# Patient Record
Sex: FEMALE | Race: WHITE | NOT HISPANIC OR LATINO | Employment: FULL TIME | URBAN - NONMETROPOLITAN AREA
[De-identification: names, ages, dates, MRNs, and addresses within clinical notes are randomized per-mention and may not be internally consistent; named-entity substitution may affect disease eponyms.]

---

## 2022-08-09 ENCOUNTER — OFFICE VISIT (OUTPATIENT)
Dept: ORTHOPEDIC SURGERY | Facility: CLINIC | Age: 57
End: 2022-08-09

## 2022-08-09 VITALS
WEIGHT: 145 LBS | BODY MASS INDEX: 24.75 KG/M2 | DIASTOLIC BLOOD PRESSURE: 78 MMHG | SYSTOLIC BLOOD PRESSURE: 119 MMHG | HEART RATE: 77 BPM | OXYGEN SATURATION: 97 % | HEIGHT: 64 IN

## 2022-08-09 DIAGNOSIS — S46.211S BICEPS STRAIN, RIGHT, SEQUELA: ICD-10-CM

## 2022-08-09 DIAGNOSIS — M75.81 INFRASPINATUS TENDONITIS, RIGHT: ICD-10-CM

## 2022-08-09 DIAGNOSIS — M75.41 IMPINGEMENT SYNDROME OF RIGHT SHOULDER: ICD-10-CM

## 2022-08-09 DIAGNOSIS — M25.611 SHOULDER STIFFNESS, RIGHT: ICD-10-CM

## 2022-08-09 DIAGNOSIS — G25.89 SCAPULAR DYSKINESIS: ICD-10-CM

## 2022-08-09 DIAGNOSIS — M75.21 BICEPS TENDONITIS ON RIGHT: Primary | ICD-10-CM

## 2022-08-09 PROCEDURE — 99204 OFFICE O/P NEW MOD 45 MIN: CPT | Performed by: FAMILY MEDICINE

## 2022-08-09 RX ORDER — MELOXICAM 7.5 MG/1
7.5 TABLET ORAL DAILY
Qty: 30 TABLET | Refills: 1 | Status: SHIPPED | OUTPATIENT
Start: 2022-08-09 | End: 2022-09-15 | Stop reason: SDUPTHER

## 2022-08-09 NOTE — PROGRESS NOTES
"New Patient Visit      Patient: Denae Dunham  YOB: 1965  Date of Encounter: 08/09/2022  PCP: Ralph Rey APRN  Referring Provider: No ref. provider found     Subjective   Denae Dunham is a 57 y.o. female who presents to the office today for evaluation of Pain, Numbness, and Initial Evaluation of the Right Upper Arm (Tingling in fingers)      Chief Complaint   Patient presents with   • Right Upper Arm - Pain, Numbness, Initial Evaluation     Tingling in fingers       HPI   Patient states she has been experiencing right arm pain since 05/2022. She explains the pain radiates down to her elbow. She describes the pain as aching \" toothache \". She has not worked since the end of 06/2022 due to the pain. She explains the pain is exacerbated when she lifts her arm up at the shoulder. She denies pain in her elbow or shoulder. She has tried Aleve and ibuprofen with no relief. She has also tried ice with no relief. She has also tried Biofreeze with no relief. She has not participated in physical therapy. She was seen by her PCP and was advised to take Aleve and ibuprofen. She had an x-ray performed 2 weeks ago. She denies any neck pain or radiating pain. She denies any numbness or weakness in her hand.      There is no problem list on file for this patient.      History reviewed. No pertinent past medical history.    Past Surgical History:   Procedure Laterality Date   • FOOT SURGERY     • TONSILECTOMY, ADENOIDECTOMY, BILATERAL MYRINGOTOMY AND TUBES     • TUBAL ABDOMINAL LIGATION         Family History   Problem Relation Age of Onset   • Cancer Mother    • Cancer Father    • Cancer Maternal Grandmother    • Cancer Paternal Grandmother        Social History     Socioeconomic History   • Marital status:    Tobacco Use   • Smoking status: Never Smoker   • Smokeless tobacco: Never Used   Vaping Use   • Vaping Use: Never used   Substance and Sexual Activity   • Alcohol use: Never       Current " "Outpatient Medications   Medication Sig Dispense Refill   • meloxicam (MOBIC) 7.5 MG tablet Take 1 tablet by mouth Daily. 30 tablet 1     No current facility-administered medications for this visit.       No Known Allergies    Review of Systems   Constitutional: Positive for activity change. Negative for fever.   Respiratory: Negative for shortness of breath and wheezing.    Cardiovascular: Negative for chest pain.   Musculoskeletal: Positive for arthralgias and myalgias.        Right arm pain.   Skin: Negative for color change and wound.   Neurological: Negative for weakness and numbness.       Visit Vitals  /78   Pulse 77   Ht 162.6 cm (64\")   Wt 65.8 kg (145 lb)   SpO2 97%   BMI 24.89 kg/m²     57 y.o.female  Physical Exam  Vitals and nursing note reviewed.   Constitutional:       General: She is not in acute distress.     Appearance: Normal appearance.   Pulmonary:      Effort: Pulmonary effort is normal. No respiratory distress.   Skin:     General: Skin is warm and dry.      Findings: No erythema.   Neurological:      General: No focal deficit present.      Mental Status: She is alert.      Sensory: No sensory deficit.      Motor: No weakness.       Right upper extremity exam:  Mild tenderness at the coracoid, not so much on the long head biceps tendon at the groove.  Not tender on the elbow joint anywhere.  Normal strength.  Pinch and  strength intact.  Intact radial pulses.  Intact deep tendon reflexes.  Positive Speed test.  Pain on resisted testing of biceps.  No pain on tricep.  Pain on resisted external rotation is also painful at the lower bicep.  Pain on shoulder range of motion, but all felt at the distal biceps.  Restricted rotation of the right shoulder.  Pain on impingement sign.  Biceps are symmetric with no Chuck sign.  No winging.    Radiology Results:    No radiology results for the last 30 days.    Assessment & Plan   Diagnoses and all orders for this visit:    1. Biceps tendonitis " on right (Primary)  -     meloxicam (MOBIC) 7.5 MG tablet; Take 1 tablet by mouth Daily.  Dispense: 30 tablet; Refill: 1  -     Ambulatory Referral to Physical Therapy Evaluate and treat    2. Biceps strain, right, sequela  -     meloxicam (MOBIC) 7.5 MG tablet; Take 1 tablet by mouth Daily.  Dispense: 30 tablet; Refill: 1  -     Ambulatory Referral to Physical Therapy Evaluate and treat    3. Shoulder stiffness, right  -     meloxicam (MOBIC) 7.5 MG tablet; Take 1 tablet by mouth Daily.  Dispense: 30 tablet; Refill: 1  -     Ambulatory Referral to Physical Therapy Evaluate and treat    4. Infraspinatus tendonitis, right  -     meloxicam (MOBIC) 7.5 MG tablet; Take 1 tablet by mouth Daily.  Dispense: 30 tablet; Refill: 1  -     Ambulatory Referral to Physical Therapy Evaluate and treat    5. Impingement syndrome of right shoulder  -     meloxicam (MOBIC) 7.5 MG tablet; Take 1 tablet by mouth Daily.  Dispense: 30 tablet; Refill: 1  -     Ambulatory Referral to Physical Therapy Evaluate and treat    6. Scapular dyskinesis  -     meloxicam (MOBIC) 7.5 MG tablet; Take 1 tablet by mouth Daily.  Dispense: 30 tablet; Refill: 1  -     Ambulatory Referral to Physical Therapy Evaluate and treat         MEDS ORDERED DURING VISIT:  New Medications Ordered This Visit   Medications   • meloxicam (MOBIC) 7.5 MG tablet     Sig: Take 1 tablet by mouth Daily.     Dispense:  30 tablet     Refill:  1     MEDICATION ISSUES:  Discussed medication options and treatment plan of prescribed medication as well as the risks, benefits, and side effects including potential falls, possible impaired driving and metabolic adversities among others. Patient is agreeable to call the office with any worsening of symptoms or onset of side effects. Patient is agreeable to call 911 or go to the nearest ER should he/she begin having SI/HI.     Discussion:    Patient large pain is largely related to biceps tendinitis exacerbated by dyskinetic scapular  motion. Also having some stiffness in the shoulder, impingement and pain of the infraspinatus. Patient is referred to physical therapy which she wants to schedule herself. I have started her on Mobic daily and given her home exercise handouts to start on now. Would consider further intervention including imaging of the shoulder, injections, advanced imaging, muscle relaxers if not improving. Follow up in 3-4 weeks for reevaluation after getting at least 2 weeks of physical therapy.             This document has been electronically signed by Declan Warren DO   August 9, 2022 11:27 EDT    Part of this note may be an electronic transcription/translation of spoken language to printed text using the Dragon Dictation System.    Transcribed from ambient dictation for Declan Warren DO by DEIRDRE JORDAN.  08/09/22   11:53 EDT    Patient verbalized consent to the visit recording.  I have personally performed the services described in this document as transcribed by the above individual, and it is both accurate and complete.  Declan Warren DO  8/17/2022  10:26 EDT

## 2022-09-15 ENCOUNTER — OFFICE VISIT (OUTPATIENT)
Dept: ORTHOPEDIC SURGERY | Facility: CLINIC | Age: 57
End: 2022-09-15

## 2022-09-15 VITALS
HEIGHT: 64 IN | OXYGEN SATURATION: 98 % | SYSTOLIC BLOOD PRESSURE: 133 MMHG | WEIGHT: 145 LBS | HEART RATE: 70 BPM | DIASTOLIC BLOOD PRESSURE: 86 MMHG | BODY MASS INDEX: 24.75 KG/M2

## 2022-09-15 DIAGNOSIS — M75.81 INFRASPINATUS TENDONITIS, RIGHT: ICD-10-CM

## 2022-09-15 DIAGNOSIS — M25.611 SHOULDER STIFFNESS, RIGHT: ICD-10-CM

## 2022-09-15 DIAGNOSIS — G25.89 SCAPULAR DYSKINESIS: ICD-10-CM

## 2022-09-15 DIAGNOSIS — M75.21 BICEPS TENDONITIS ON RIGHT: ICD-10-CM

## 2022-09-15 DIAGNOSIS — M75.41 IMPINGEMENT SYNDROME OF RIGHT SHOULDER: ICD-10-CM

## 2022-09-15 DIAGNOSIS — S46.211S BICEPS STRAIN, RIGHT, SEQUELA: ICD-10-CM

## 2022-09-15 PROCEDURE — 99214 OFFICE O/P EST MOD 30 MIN: CPT | Performed by: FAMILY MEDICINE

## 2022-09-15 RX ORDER — MELOXICAM 7.5 MG/1
7.5 TABLET ORAL DAILY
Qty: 30 TABLET | Refills: 2 | Status: SHIPPED | OUTPATIENT
Start: 2022-09-15

## 2022-09-15 NOTE — PROGRESS NOTES
New Patient Visit      Patient: Denae Dunham  YOB: 1965  Date of Encounter: 09/15/2022  PCP: Ralph Rey APRN  Referring Provider: No ref. provider found     Subjective   Denae Dunham is a 57 y.o. female who presents to the office today for evaluation of Follow-up and Pain of the Right Arm (Bicep pain)      Chief Complaint   Patient presents with   • Right Arm - Follow-up, Pain     Bicep pain       HPI     Patient continues to have right arm and shoulder pain. The pain is localized to the deltoid distribution in the right arm. The pain is worse at night. The pain does not radiate up to the neck. Patient did physical therapy for a couple of weeks and was able to do the exercises at home. Patient has been doing the exercises at home but not every day. Patient has been off work for the past 2 weeks because they will not allow her to go back without and go back with light duty. Patient has been taking Mobic with some relief. Patient feels 60 percent improved since starting the medication.    There is no problem list on file for this patient.      History reviewed. No pertinent past medical history.    Past Surgical History:   Procedure Laterality Date   • FOOT SURGERY     • TONSILECTOMY, ADENOIDECTOMY, BILATERAL MYRINGOTOMY AND TUBES     • TUBAL ABDOMINAL LIGATION         Family History   Problem Relation Age of Onset   • Cancer Mother    • Cancer Father    • Cancer Maternal Grandmother    • Cancer Paternal Grandmother        Social History     Socioeconomic History   • Marital status:    Tobacco Use   • Smoking status: Never Smoker   • Smokeless tobacco: Never Used   Vaping Use   • Vaping Use: Never used   Substance and Sexual Activity   • Alcohol use: Never       Current Outpatient Medications   Medication Sig Dispense Refill   • meloxicam (MOBIC) 7.5 MG tablet Take 1 tablet by mouth Daily. 30 tablet 2     No current facility-administered medications for this visit.       No Known  "Allergies    Review of Systems   Constitutional: Positive for activity change. Negative for fever.   Respiratory: Negative for shortness of breath and wheezing.    Cardiovascular: Negative for chest pain.   Musculoskeletal: Positive for arthralgias and myalgias.        Right arm pain.   Skin: Negative for color change and wound.   Neurological: Negative for weakness and numbness.       Visit Vitals  /86   Pulse 70   Ht 162.6 cm (64\")   Wt 65.8 kg (145 lb)   SpO2 98%   BMI 24.89 kg/m²     57 y.o.female  Physical Exam  Vitals and nursing note reviewed.   Constitutional:       General: She is not in acute distress.     Appearance: Normal appearance.   Pulmonary:      Effort: Pulmonary effort is normal. No respiratory distress.   Musculoskeletal:      Comments:   Right arm:  Tender on the biceps tendon.  Tender on the coracoid.  Full range of motion.  Pain at end range external rotation.  Pain on [place] test.  Pain without weakness on supraspinatus testing.  Pain on biceps testing without weakness.  Pain on Speed's test.  No pain on the rest of the rotator cuff or triceps.  Impingement signs cause pain in the biceps.  Mild dyskinetic scapular motion on the right.    Neck:  Nontender neck and paraspinals.  Patient does have some muscle spasm.  No pain on any movement of the neck or on Spurling's.   Skin:     General: Skin is warm and dry.      Findings: No erythema.   Neurological:      General: No focal deficit present.      Mental Status: She is alert.      Sensory: No sensory deficit.      Motor: No weakness.         Radiology Results:    No radiology results for the last 30 days.    Assessment & Plan   Diagnoses and all orders for this visit:    1. Biceps tendonitis on right  -     meloxicam (MOBIC) 7.5 MG tablet; Take 1 tablet by mouth Daily.  Dispense: 30 tablet; Refill: 2    2. Biceps strain, right, sequela  -     meloxicam (MOBIC) 7.5 MG tablet; Take 1 tablet by mouth Daily.  Dispense: 30 tablet; Refill: " 2    3. Shoulder stiffness, right  -     meloxicam (MOBIC) 7.5 MG tablet; Take 1 tablet by mouth Daily.  Dispense: 30 tablet; Refill: 2    4. Infraspinatus tendonitis, right  -     meloxicam (MOBIC) 7.5 MG tablet; Take 1 tablet by mouth Daily.  Dispense: 30 tablet; Refill: 2    5. Impingement syndrome of right shoulder  -     meloxicam (MOBIC) 7.5 MG tablet; Take 1 tablet by mouth Daily.  Dispense: 30 tablet; Refill: 2    6. Scapular dyskinesis  -     meloxicam (MOBIC) 7.5 MG tablet; Take 1 tablet by mouth Daily.  Dispense: 30 tablet; Refill: 2         MEDS ORDERED DURING VISIT:  New Medications Ordered This Visit   Medications   • meloxicam (MOBIC) 7.5 MG tablet     Sig: Take 1 tablet by mouth Daily.     Dispense:  30 tablet     Refill:  2     MEDICATION ISSUES:  Discussed medication options and treatment plan of prescribed medication as well as the risks, benefits, and side effects including potential falls, possible impaired driving and metabolic adversities among others. Patient is agreeable to call the office with any worsening of symptoms or onset of side effects. Patient is agreeable to call 911 or go to the nearest ER should he/she begin having SI/HI.     Discussion:  Patient was having notable improvement from physical therapy after 2 weeks but discontinued for some reason. She has been doing the exercises at home, but she is still off from work as they will not allow her to go back without and to go back with light duty. Patient continues to have apparent significant biceps tendinitis and biceps muscle pain which is bothering her at night as well, but also seems to be having some pain coming from the labrum and supraspinatus. Recommend patient go back to therapy three times a week while she is off from work and we will reevaluate again in 2 to 3 weeks. Would consider further intervention such as injections or advanced imaging if not improving. Refilled patient's Mobic and would consider increasing dose if  needed.               This document has been electronically signed by Declan Warren DO   September 15, 2022 09:39 EDT    Part of this note may be an electronic transcription/translation of spoken language to printed text using the Dragon Dictation System.    Transcribed from ambient dictation for Declan Warren DO by HERLINDA GENAO.  09/15/22   10:02 EDT    Patient verbalized consent to the visit recording.  I have personally performed the services described in this document as transcribed by the above individual, and it is both accurate and complete.

## 2022-09-19 ENCOUNTER — TELEPHONE (OUTPATIENT)
Dept: ORTHOPEDIC SURGERY | Facility: CLINIC | Age: 57
End: 2022-09-19

## 2022-09-19 DIAGNOSIS — S46.211S BICEPS STRAIN, RIGHT, SEQUELA: ICD-10-CM

## 2022-09-19 DIAGNOSIS — M75.81 INFRASPINATUS TENDONITIS, RIGHT: ICD-10-CM

## 2022-09-19 DIAGNOSIS — M75.41 IMPINGEMENT SYNDROME OF RIGHT SHOULDER: ICD-10-CM

## 2022-09-19 DIAGNOSIS — G25.89 SCAPULAR DYSKINESIS: ICD-10-CM

## 2022-09-19 DIAGNOSIS — M25.611 SHOULDER STIFFNESS, RIGHT: ICD-10-CM

## 2022-09-19 DIAGNOSIS — M75.21 BICEPS TENDONITIS ON RIGHT: Primary | ICD-10-CM

## 2022-09-19 NOTE — TELEPHONE ENCOUNTER
Caller: [facility]    Relationship: PHYSICAL THERAPY    Best call back number:[phone]    What specialty or service is being requested: PHYSICAL THERAPY    What is the provider, practice or medical service name: [facility]      Any additional details: PATIENT HAS APPT 9.20.22 AND CURRENT REFERRAL IS DATED 8.9.22  [facility] REQUESTING UPDATED REFERRAL IN TIME FOR TOMORROW'S APPT    UNABLE TO WARM TRANSFER

## 2022-09-19 NOTE — TELEPHONE ENCOUNTER
Hub staff attempted to follow warm transfer process and was unsuccessful     Caller: Denae Dunham    Relationship to patient: Self    Best call back number: [phone]    Patient is needing: PATIENT WANTED TO SPEAK WITH DR TELLEZ NURSE- WANTED TO KNOW IF SHE DECIDED TO DO THE INJECTION IF SHE WOULD STILL HAVE TO GO TO THERAPY    PLEASE CALL TO [facility] -TY

## 2022-09-21 ENCOUNTER — TELEPHONE (OUTPATIENT)
Dept: ORTHOPEDIC SURGERY | Facility: CLINIC | Age: 57
End: 2022-09-21

## 2022-09-21 NOTE — TELEPHONE ENCOUNTER
Called patient in response to her message about if she got the injection would she have to do therapy. I told her that Dr. Warren said that it would depend if the injection helped. She said she has 2 more visits with PT so will just finish up then come back in and if PT didn't help then do the injection.

## 2022-10-04 ENCOUNTER — HOSPITAL ENCOUNTER (OUTPATIENT)
Dept: GENERAL RADIOLOGY | Facility: HOSPITAL | Age: 57
Discharge: HOME | End: 2022-10-04
Admitting: FAMILY MEDICINE

## 2022-10-04 ENCOUNTER — OFFICE VISIT (OUTPATIENT)
Dept: ORTHOPEDIC SURGERY | Facility: CLINIC | Age: 57
End: 2022-10-04

## 2022-10-04 VITALS
HEIGHT: 64 IN | OXYGEN SATURATION: 99 % | DIASTOLIC BLOOD PRESSURE: 90 MMHG | HEART RATE: 72 BPM | SYSTOLIC BLOOD PRESSURE: 139 MMHG | WEIGHT: 145 LBS | BODY MASS INDEX: 24.75 KG/M2

## 2022-10-04 DIAGNOSIS — M77.9 BONE SPUR: ICD-10-CM

## 2022-10-04 DIAGNOSIS — G25.89 SCAPULAR DYSKINESIS: ICD-10-CM

## 2022-10-04 DIAGNOSIS — M75.41 IMPINGEMENT SYNDROME OF RIGHT SHOULDER: ICD-10-CM

## 2022-10-04 DIAGNOSIS — M25.611 SHOULDER STIFFNESS, RIGHT: ICD-10-CM

## 2022-10-04 DIAGNOSIS — M75.81 INFRASPINATUS TENDONITIS, RIGHT: ICD-10-CM

## 2022-10-04 DIAGNOSIS — M79.621: ICD-10-CM

## 2022-10-04 DIAGNOSIS — S46.211S BICEPS STRAIN, RIGHT, SEQUELA: ICD-10-CM

## 2022-10-04 DIAGNOSIS — M75.21 BICEPS TENDONITIS ON RIGHT: Primary | ICD-10-CM

## 2022-10-04 PROCEDURE — 73030 X-RAY EXAM OF SHOULDER: CPT

## 2022-10-04 PROCEDURE — 99214 OFFICE O/P EST MOD 30 MIN: CPT | Performed by: FAMILY MEDICINE

## 2022-10-04 PROCEDURE — 73030 X-RAY EXAM OF SHOULDER: CPT | Performed by: RADIOLOGY

## 2022-10-04 NOTE — PROGRESS NOTES
"Follow Up Visit      Patient: Denae Dunham  YOB: 1965  Date of Encounter: 10/04/2022  PCP: Ralph Rey APRN  Referring Provider: No ref. provider found     Subjective   Denae Dunham is a 57 y.o. female who presents to the office today for evaluation of Pain and Follow-up of the Right Shoulder (In bicep)      Chief Complaint   Patient presents with   • Right Shoulder - Pain, Follow-up     In bicep       HPI      The patient is a 57-year-old female who presents to the office today for evaluation of right arm, bicep and shoulder pain.    The patient presents for follow up for right arm and bicep pain, and right shoulder pain.    The patient was started on meloxicam at the last visit and has been doing physical therapy. She had discontinued it at the last visit, but I recommended she start back up while she was off from work. She presents after doing 2 more weeks of therapy. The patient reports her shoulder is doing well during the day, however, at night, she is still experiencing pain in the right arm. She has been going to physical therapy for the past 2 weeks with no improvement. She has been taking the meloxicam 2 pills per day for the past 2 weeks with no improvement. She has tried sleeping in the sling with no improvement.    Patient had a breast biopsy in 10/2021.        There is no problem list on file for this patient.      History reviewed. No pertinent past medical history.    No Known Allergies    Review of Systems   Constitutional: Positive for activity change. Negative for fever.   Respiratory: Negative for shortness of breath and wheezing.    Cardiovascular: Negative for chest pain.   Musculoskeletal: Positive for arthralgias and myalgias.        Right arm pain.   Skin: Negative for color change and wound.   Neurological: Negative for weakness and numbness.       Visit Vitals  /90   Pulse 72   Ht 162.6 cm (64\")   Wt 65.8 kg (145 lb)   SpO2 99%   BMI 24.89 kg/m²     57 " y.o.female  Physical Exam  Vitals and nursing note reviewed.   Constitutional:       General: She is not in acute distress.     Appearance: Normal appearance.   Neck:      Comments: Non-tender cervical spine, nontender paraspinals.  Tender in the trapezius.  Mildly restricted neck range of motion.  Negative Spurling.    Pulmonary:      Effort: Pulmonary effort is normal. No respiratory distress.   Musculoskeletal:      Comments: Tender in the deltoid distribution on the right shoulder.  Tender over coracoid and biceps tendon.  Full range of motion.  Pain on end range of external rotation felt in the deltoid.  Very mild pain on [place] test.  Negative Neer.  Pain in the deltoid and biceps on Chandler test.  Pulling in the biceps.  Pain in the biceps on resisted testing without weakness.  Triceps is okay.  Pain in the biceps on infraspinatus testing.  No weakness.  Negative testing of subscapularis.  AC crossover and shear caused pain in the deltoid area and biceps area.  Pain in the distal biceps on Speed test.  Very mild soreness.  Dyskinetic right scapular motion, particularly on arm flexion and mild winging on wall push-up.   Skin:     General: Skin is warm and dry.      Findings: No erythema.   Neurological:      General: No focal deficit present.      Mental Status: She is alert.      Sensory: No sensory deficit.      Motor: No weakness.         Radiology Results:    XR Shoulder 2+ View Right    Result Date: 10/4/2022    No acute findings in the right shoulder.  This report was finalized on 10/4/2022 11:24 AM by Dr. Suhas Obrien MD.      X-ray right shoulder 10/4/2022 preliminary result no significant degenerative changes.  No acute fractures or bony abnormalities.  Small spur/enthesophyte at deltoid origin on superior acromion.  Patient is tender at this location.  Also enthesophyte/tendon calcification on humeral head at supraspinatus/infraspinatus insertion point.        Assessment & Plan   Diagnoses and all  orders for this visit:    1. Biceps tendonitis on right (Primary)  -     XR Shoulder 2+ View Right  -     MRI Shoulder Right Without Contrast; Future    2. Biceps strain, right, sequela  -     XR Shoulder 2+ View Right  -     MRI Shoulder Right Without Contrast; Future    3. Shoulder stiffness, right  -     XR Shoulder 2+ View Right  -     MRI Shoulder Right Without Contrast; Future    4. Infraspinatus tendonitis, right  -     XR Shoulder 2+ View Right  -     MRI Shoulder Right Without Contrast; Future    5. Impingement syndrome of right shoulder  -     XR Shoulder 2+ View Right  -     MRI Shoulder Right Without Contrast; Future    6. Scapular dyskinesis  -     XR Shoulder 2+ View Right  -     MRI Shoulder Right Without Contrast; Future    7. Pain in superior right upper extremity  -     XR Shoulder 2+ View Right  -     MRI Shoulder Right Without Contrast; Future         MEDS ORDERED DURING VISIT:  No orders of the defined types were placed in this encounter.    MEDICATION ISSUES:  Discussed medication options and treatment plan of prescribed medication as well as the risks, benefits, and side effects including potential falls, possible impaired driving and metabolic adversities among others. Patient is agreeable to call the office with any worsening of symptoms or onset of side effects. Patient is agreeable to call 911 or go to the nearest ER should he/she begin having SI/HI.     Discussion:  Bone spur on the acromion does explain the patient's deltoid pain, however it does not explain her ongoing biceps/labrum type symptoms.      The patient continues to have right upper arm and shoulder pain radiating down from the shoulder into the bicep, mainly the bicep but also sometimes the deltoid distribution. There does not seem to be a cervical component at this time or neurologic component but the patient is still getting significant pain at night when lying down. Range of motion is improved with PT but pain is not.  Still getting pain at end range of external rotation, pain on AC crossover and impingement signs and bicep testing and [place] testing. I am concerned for damage to the proximal bicep and the labrum in particular and so I am recommending an MRI to fully evaluate these areas which have not been improving with conservative management. We will follow up for MRI results.             This document has been electronically signed by Declan Warren DO   October 4, 2022 10:04 EDT    Dictated Utilizing Dragon Dictation: Part of this note may be an electronic transcription/translation of spoken language to printed text using the Dragon Dictation System.    .Transcribed from ambient dictation for Declan Warren DO by Jessenia Rey.  10/04/22   11:25 EDT    Patient verbalized consent to the visit recording.  I have personally performed the services described in this document as transcribed by the above individual, and it is both accurate and complete.   Transcribed from ambient dictation for Declan Warren DO by Silvia Serrato  10/04/22   11:25 EDT

## 2022-10-24 ENCOUNTER — HOSPITAL ENCOUNTER (OUTPATIENT)
Dept: MRI IMAGING | Facility: HOSPITAL | Age: 57
Discharge: HOME | End: 2022-10-24
Admitting: FAMILY MEDICINE

## 2022-10-24 DIAGNOSIS — M75.81 INFRASPINATUS TENDONITIS, RIGHT: ICD-10-CM

## 2022-10-24 DIAGNOSIS — M75.41 IMPINGEMENT SYNDROME OF RIGHT SHOULDER: ICD-10-CM

## 2022-10-24 DIAGNOSIS — M75.21 BICEPS TENDONITIS ON RIGHT: ICD-10-CM

## 2022-10-24 DIAGNOSIS — M79.621: ICD-10-CM

## 2022-10-24 DIAGNOSIS — G25.89 SCAPULAR DYSKINESIS: ICD-10-CM

## 2022-10-24 DIAGNOSIS — M25.611 SHOULDER STIFFNESS, RIGHT: ICD-10-CM

## 2022-10-24 DIAGNOSIS — S46.211S BICEPS STRAIN, RIGHT, SEQUELA: ICD-10-CM

## 2022-10-24 PROCEDURE — 73221 MRI JOINT UPR EXTREM W/O DYE: CPT | Performed by: RADIOLOGY

## 2022-10-24 PROCEDURE — 73221 MRI JOINT UPR EXTREM W/O DYE: CPT

## 2022-10-27 ENCOUNTER — OFFICE VISIT (OUTPATIENT)
Dept: ORTHOPEDIC SURGERY | Facility: CLINIC | Age: 57
End: 2022-10-27

## 2022-10-27 ENCOUNTER — APPOINTMENT (OUTPATIENT)
Dept: MRI IMAGING | Facility: HOSPITAL | Age: 57
End: 2022-10-27

## 2022-10-27 VITALS
DIASTOLIC BLOOD PRESSURE: 78 MMHG | SYSTOLIC BLOOD PRESSURE: 117 MMHG | OXYGEN SATURATION: 100 % | HEIGHT: 64 IN | HEART RATE: 78 BPM | WEIGHT: 145 LBS | BODY MASS INDEX: 24.75 KG/M2

## 2022-10-27 DIAGNOSIS — G25.89 SCAPULAR DYSKINESIS: ICD-10-CM

## 2022-10-27 DIAGNOSIS — M75.21 BICEPS TENDONITIS ON RIGHT: ICD-10-CM

## 2022-10-27 DIAGNOSIS — M19.011 OSTEOARTHRITIS OF RIGHT AC (ACROMIOCLAVICULAR) JOINT: ICD-10-CM

## 2022-10-27 DIAGNOSIS — M25.611 SHOULDER STIFFNESS, RIGHT: ICD-10-CM

## 2022-10-27 DIAGNOSIS — S46.811A INFRASPINATUS TENDON TEAR, RIGHT, INITIAL ENCOUNTER: ICD-10-CM

## 2022-10-27 DIAGNOSIS — M77.9 BONE SPUR: ICD-10-CM

## 2022-10-27 DIAGNOSIS — S46.811A TRAUMATIC RUPTURE OF SUPRASPINATUS TENDON OF RIGHT SHOULDER, INITIAL ENCOUNTER: ICD-10-CM

## 2022-10-27 DIAGNOSIS — M79.621: Primary | ICD-10-CM

## 2022-10-27 DIAGNOSIS — S46.211S BICEPS STRAIN, RIGHT, SEQUELA: ICD-10-CM

## 2022-10-27 DIAGNOSIS — M75.41 IMPINGEMENT SYNDROME OF RIGHT SHOULDER: ICD-10-CM

## 2022-10-27 PROCEDURE — 20610 DRAIN/INJ JOINT/BURSA W/O US: CPT | Performed by: FAMILY MEDICINE

## 2022-10-27 PROCEDURE — 99214 OFFICE O/P EST MOD 30 MIN: CPT | Performed by: FAMILY MEDICINE

## 2022-10-27 RX ORDER — KETOROLAC TROMETHAMINE 30 MG/ML
60 INJECTION, SOLUTION INTRAMUSCULAR; INTRAVENOUS ONCE
Status: COMPLETED | OUTPATIENT
Start: 2022-10-27 | End: 2022-10-27

## 2022-10-27 RX ADMIN — KETOROLAC TROMETHAMINE 60 MG: 30 INJECTION, SOLUTION INTRAMUSCULAR; INTRAVENOUS at 13:48

## 2022-10-27 RX ADMIN — LIDOCAINE HYDROCHLORIDE 5 ML: 10 INJECTION, SOLUTION EPIDURAL; INFILTRATION; INTRACAUDAL; PERINEURAL at 13:02

## 2022-10-27 NOTE — PROGRESS NOTES
"Follow Up Visit      Patient: Denae Dunham  YOB: 1965  Date of Encounter: 10/27/2022  PCP: Ralph Rey APRN  Referring Provider: No ref. provider found     Subjective   Denae Dunham is a 57 y.o. female who presents to the office today for evaluation of Pain and Follow-up of the Right Shoulder and MRI Review      Chief Complaint   Patient presents with   • Right Shoulder - Pain, Follow-up   • MRI Review       HPI       The patient has not improved with conservative management including physical therapy and medications. She has not had any injections yet. Reports that she has been flared up for the last 3 days with significant pain on raising the arm and on rotation. Patient has done with PT and it has not been of any benefit. Patient had an magnetic resonance imaging and needs to go over results.    There is no problem list on file for this patient.      History reviewed. No pertinent past medical history.    No Known Allergies    Review of Systems   Constitutional: Positive for activity change. Negative for fever.   Respiratory: Negative for shortness of breath and wheezing.    Cardiovascular: Negative for chest pain.   Musculoskeletal: Positive for arthralgias and myalgias.        Right arm pain.   Skin: Negative for color change and wound.   Neurological: Negative for weakness and numbness.       Visit Vitals  /78   Pulse 78   Ht 162.6 cm (64\")   Wt 65.8 kg (145 lb)   SpO2 100%   BMI 24.89 kg/m²     57 y.o.female  Physical Exam  Vitals and nursing note reviewed.   Constitutional:       General: She is not in acute distress.     Appearance: Normal appearance.   Pulmonary:      Effort: Pulmonary effort is normal. No respiratory distress.   Musculoskeletal:      Comments: Right shoulder:  Tender over anterior and posterior joint, biceps tendon and coracoid.  Generally restricted range of motion with pain on elevation and on internal rotation in particular.  Mild weakness on " supraspinatus testing with some pain.  Significant pain on infraspinatus testing without weakness.  No pain or weakness on subscapularis or triceps testing.  Pain on bicep testing.  Positive impingement signs.   Skin:     General: Skin is warm and dry.      Findings: No erythema.   Neurological:      General: No focal deficit present.      Mental Status: She is alert.      Sensory: No sensory deficit.      Motor: No weakness.         Radiology Results:    XR Shoulder 2+ View Right    Result Date: 10/4/2022    No acute findings in the right shoulder.  This report was finalized on 10/4/2022 11:24 AM by Dr. Suhas Obrien MD.      MRI Shoulder Right Without Contrast    Result Date: 10/24/2022  1.  9 mm grade 3 full-thickness tear distal supraspinatus tendon at level of humeral insertion. No retraction. 2.  Grade 2 5 mm partial-thickness articular surface tear infraspinatus tendon at level of humeral insertion. 3.  Hypertrophic AC joint arthropathy and lateral acromial downsloping. 4.  Subdeltoid/subacromial bursitis. 5.  No acute labral pathology.  This report was finalized on 10/24/2022 1:15 PM by Dr. Suhas Obrien MD.      Large Joint Arthrocentesis: R subacromial bursa  Date/Time: 10/27/2022 1:02 PM  Consent given by: patient  Site marked: site marked  Timeout: Immediately prior to procedure a time out was called to verify the correct patient, procedure, equipment, support staff and site/side marked as required   Supporting Documentation  Indications: pain   Procedure Details  Location: shoulder - R subacromial bursa  Needle size: 22 G  Approach: posterior  Medications administered: 5 mL lidocaine PF 1% 1 %  Patient tolerance: patient tolerated the procedure well with no immediate complications       Injection site in the posterior shoulder was cleaned with alcohol and iodine.  Anesthesia with ethyl chloride.  Then using sterile technique the subacromial space was accessed with a 22-gauge 1.5 inch needle injected  with 5 cc 1% lidocaine without epinephrine and 60 mg toradol.  Injection site was covered with sterile bandage.  There were no immediate adverse effects and negligible blood loss..  Follow-up care and precautions were discussed.    Assessment & Plan   Diagnoses and all orders for this visit:    1. Pain in superior right upper extremity (Primary)  -     Large Joint Arthrocentesis: R subacromial bursa  -     ketorolac (TORADOL) injection 60 mg  -     Ambulatory Referral to Orthopedic Surgery    2. Traumatic rupture of supraspinatus tendon of right shoulder, initial encounter  -     Large Joint Arthrocentesis: R subacromial bursa  -     ketorolac (TORADOL) injection 60 mg  -     Ambulatory Referral to Orthopedic Surgery    3. Infraspinatus tendon tear, right, initial encounter - partial  -     Large Joint Arthrocentesis: R subacromial bursa  -     ketorolac (TORADOL) injection 60 mg  -     Ambulatory Referral to Orthopedic Surgery    4. Osteoarthritis of right AC (acromioclavicular) joint  -     Large Joint Arthrocentesis: R subacromial bursa  -     ketorolac (TORADOL) injection 60 mg  -     Ambulatory Referral to Orthopedic Surgery    5. Shoulder stiffness, right  -     Large Joint Arthrocentesis: R subacromial bursa  -     ketorolac (TORADOL) injection 60 mg    6. Bone spur - superior acromion- right  -     Large Joint Arthrocentesis: R subacromial bursa  -     ketorolac (TORADOL) injection 60 mg  -     Ambulatory Referral to Orthopedic Surgery    7. Impingement syndrome of right shoulder  -     Large Joint Arthrocentesis: R subacromial bursa  -     ketorolac (TORADOL) injection 60 mg  -     Ambulatory Referral to Orthopedic Surgery    8. Scapular dyskinesis  -     Large Joint Arthrocentesis: R subacromial bursa  -     ketorolac (TORADOL) injection 60 mg    9. Biceps strain, right, sequela  -     Large Joint Arthrocentesis: R subacromial bursa  -     ketorolac (TORADOL) injection 60 mg    10. Biceps tendonitis on  right  -     Large Joint Arthrocentesis: R subacromial bursa  -     ketorolac (TORADOL) injection 60 mg         MEDS ORDERED DURING VISIT:  New Medications Ordered This Visit   Medications   • ketorolac (TORADOL) injection 60 mg     MEDICATION ISSUES:  Discussed medication options and treatment plan of prescribed medication as well as the risks, benefits, and side effects including potential falls, possible impaired driving and metabolic adversities among others. Patient is agreeable to call the office with any worsening of symptoms or onset of side effects. Patient is agreeable to call 911 or go to the nearest ER should he/she begin having SI/HI.     Discussion:    I reviewed the magnetic resonance imaging with the patient showing full thickness supraspinatus tear, partial infraspinatus tear, and hypertrophic acromioclavicular joint arthritis. Biceps and labrum appeared intact. I discussed with the patient that supraspinatus tear is likely repairable at this point because it is not showing any retraction, but this will not be the case forever and if she wants to get it fixed, she should consider going ahead and doing it sooner rather than later. The patient elected to receive a subacromial injection today with Toradol instead of steroid to avoid delaying any potential surgeries. She did have some relief of pain after but was not significantly improved. We will discuss patient with Dr. Hull to see if he will consult with her for repair. Patient will follow up in 1 to 2 weeks.           This document has been electronically signed by Declan Warren DO   Transcribed from ambient dictation for Declan Warren DO by Mari Chester  10/27/22   14:16 EDT    I have personally performed the services described in this document as transcribed by the above individual, and it is both accurate and complete.    October 27, 2022 13:04 EDT    Dictated Utilizing Dragon Dictation: Part of this note may be an electronic  transcription/translation of spoken language to printed text using the Dragon Dictation System.

## 2022-10-29 RX ORDER — LIDOCAINE HYDROCHLORIDE 10 MG/ML
5 INJECTION, SOLUTION EPIDURAL; INFILTRATION; INTRACAUDAL; PERINEURAL
Status: COMPLETED | OUTPATIENT
Start: 2022-10-27 | End: 2022-10-27

## 2022-11-01 ENCOUNTER — APPOINTMENT (OUTPATIENT)
Dept: MRI IMAGING | Facility: HOSPITAL | Age: 57
End: 2022-11-01

## 2022-11-03 ENCOUNTER — OFFICE VISIT (OUTPATIENT)
Dept: ORTHOPEDIC SURGERY | Facility: CLINIC | Age: 57
End: 2022-11-03

## 2022-11-03 VITALS
OXYGEN SATURATION: 98 % | HEIGHT: 64 IN | HEART RATE: 89 BPM | SYSTOLIC BLOOD PRESSURE: 104 MMHG | BODY MASS INDEX: 24.75 KG/M2 | DIASTOLIC BLOOD PRESSURE: 69 MMHG | WEIGHT: 145 LBS

## 2022-11-03 DIAGNOSIS — M75.21 BICEPS TENDONITIS ON RIGHT: ICD-10-CM

## 2022-11-03 DIAGNOSIS — M19.011 OSTEOARTHRITIS OF RIGHT AC (ACROMIOCLAVICULAR) JOINT: ICD-10-CM

## 2022-11-03 DIAGNOSIS — M77.9 BONE SPUR: ICD-10-CM

## 2022-11-03 DIAGNOSIS — G25.89 SCAPULAR DYSKINESIS: ICD-10-CM

## 2022-11-03 DIAGNOSIS — M75.41 IMPINGEMENT SYNDROME OF RIGHT SHOULDER: ICD-10-CM

## 2022-11-03 DIAGNOSIS — M79.621: Primary | ICD-10-CM

## 2022-11-03 DIAGNOSIS — M25.611 SHOULDER STIFFNESS, RIGHT: ICD-10-CM

## 2022-11-03 DIAGNOSIS — S46.211S BICEPS STRAIN, RIGHT, SEQUELA: ICD-10-CM

## 2022-11-03 DIAGNOSIS — S46.811A TRAUMATIC RUPTURE OF SUPRASPINATUS TENDON OF RIGHT SHOULDER, INITIAL ENCOUNTER: ICD-10-CM

## 2022-11-03 DIAGNOSIS — S46.811A INFRASPINATUS TENDON TEAR, RIGHT, INITIAL ENCOUNTER: ICD-10-CM

## 2022-11-03 PROCEDURE — 99214 OFFICE O/P EST MOD 30 MIN: CPT | Performed by: FAMILY MEDICINE

## 2022-11-03 RX ORDER — DICLOFENAC SODIUM 20 MG/G
1 SOLUTION TOPICAL EVERY 12 HOURS PRN
Qty: 8 G | Refills: 0 | COMMUNITY
Start: 2022-11-03

## 2022-11-03 NOTE — PROGRESS NOTES
"Follow Up Visit      Patient: Denae Dunham  YOB: 1965  Date of Encounter: 11/03/2022  PCP: Ralph Rey APRN  Referring Provider: No ref. provider found     Subjective   Denae Dunham is a 57 y.o. female who presents to the office today for evaluation of Pain and Follow-up of the Right Shoulder      Chief Complaint   Patient presents with   • Right Shoulder - Pain, Follow-up       HPI  Patient presents for follow-up for right shoulder pain related to multiple rotator cuff tears and AC arthritis.  Patient had a subacromial injection lidocaine and Toradol at the last visit and reports no improvement after and was actually more sore that night than usual.  Now back to baseline.  Got no benefit from the injection  There is no problem list on file for this patient.      History reviewed. No pertinent past medical history.    No Known Allergies    Review of Systems   Constitutional: Positive for activity change. Negative for fever.   Respiratory: Negative for shortness of breath and wheezing.    Cardiovascular: Negative for chest pain.   Musculoskeletal: Positive for arthralgias and myalgias.        Right arm pain.   Skin: Negative for color change and wound.   Neurological: Positive for weakness. Negative for numbness.       Visit Vitals  /69   Pulse 89   Ht 162.6 cm (64\")   Wt 65.8 kg (145 lb)   SpO2 98%   BMI 24.89 kg/m²     57 y.o.female  Physical Exam  Vitals and nursing note reviewed.   Constitutional:       General: She is not in acute distress.     Appearance: Normal appearance.   Pulmonary:      Effort: Pulmonary effort is normal. No respiratory distress.   Musculoskeletal:      Comments: Right shoulder:  Tender over anterior and posterior joint, biceps tendon and coracoid.  Generally restricted range of motion with pain on elevation and on internal rotation in particular.  Mild weakness on supraspinatus testing with pain.  Significant pain on infraspinatus testing without " weakness.  No pain or weakness on subscapularis or triceps testing.  Pain on bicep testing.  Positive impingement signs.  Stiffness and pain on AC crossover and shear test  Overall no improvement from previous   Skin:     General: Skin is warm and dry.      Findings: No erythema.   Neurological:      General: No focal deficit present.      Mental Status: She is alert.      Sensory: No sensory deficit.      Motor: No weakness.         Radiology Results:    XR Shoulder 2+ View Right    Result Date: 10/4/2022    No acute findings in the right shoulder.  This report was finalized on 10/4/2022 11:24 AM by Dr. Suhas Obrien MD.      MRI Shoulder Right Without Contrast    Result Date: 10/24/2022  1.  9 mm grade 3 full-thickness tear distal supraspinatus tendon at level of humeral insertion. No retraction. 2.  Grade 2 5 mm partial-thickness articular surface tear infraspinatus tendon at level of humeral insertion. 3.  Hypertrophic AC joint arthropathy and lateral acromial downsloping. 4.  Subdeltoid/subacromial bursitis. 5.  No acute labral pathology.  This report was finalized on 10/24/2022 1:15 PM by Dr. Suhas Obrien MD.        Assessment & Plan   Diagnoses and all orders for this visit:    1. Pain in superior right upper extremity (Primary)  -     Diclofenac Sodium (Pennsaid) 2 % solution; Apply 1 packet topically Every 12 (Twelve) Hours As Needed (pain).  Dispense: 8 g; Refill: 0    2. Traumatic rupture of supraspinatus tendon of right shoulder, initial encounter  -     Diclofenac Sodium (Pennsaid) 2 % solution; Apply 1 packet topically Every 12 (Twelve) Hours As Needed (pain).  Dispense: 8 g; Refill: 0    3. Infraspinatus tendon tear, right, initial encounter - partial  -     Diclofenac Sodium (Pennsaid) 2 % solution; Apply 1 packet topically Every 12 (Twelve) Hours As Needed (pain).  Dispense: 8 g; Refill: 0    4. Osteoarthritis of right AC (acromioclavicular) joint  -     Diclofenac Sodium (Pennsaid) 2 % solution;  Apply 1 packet topically Every 12 (Twelve) Hours As Needed (pain).  Dispense: 8 g; Refill: 0    5. Shoulder stiffness, right  -     Diclofenac Sodium (Pennsaid) 2 % solution; Apply 1 packet topically Every 12 (Twelve) Hours As Needed (pain).  Dispense: 8 g; Refill: 0    6. Bone spur - superior acromion- right  -     Diclofenac Sodium (Pennsaid) 2 % solution; Apply 1 packet topically Every 12 (Twelve) Hours As Needed (pain).  Dispense: 8 g; Refill: 0    7. Impingement syndrome of right shoulder  -     Diclofenac Sodium (Pennsaid) 2 % solution; Apply 1 packet topically Every 12 (Twelve) Hours As Needed (pain).  Dispense: 8 g; Refill: 0    8. Biceps strain, right, sequela  -     Diclofenac Sodium (Pennsaid) 2 % solution; Apply 1 packet topically Every 12 (Twelve) Hours As Needed (pain).  Dispense: 8 g; Refill: 0    9. Scapular dyskinesis  -     Diclofenac Sodium (Pennsaid) 2 % solution; Apply 1 packet topically Every 12 (Twelve) Hours As Needed (pain).  Dispense: 8 g; Refill: 0    10. Biceps tendonitis on right  -     Diclofenac Sodium (Pennsaid) 2 % solution; Apply 1 packet topically Every 12 (Twelve) Hours As Needed (pain).  Dispense: 8 g; Refill: 0         MEDS ORDERED DURING VISIT:  No orders of the defined types were placed in this encounter.    MEDICATION ISSUES:  Discussed medication options and treatment plan of prescribed medication as well as the risks, benefits, and side effects including potential falls, possible impaired driving and metabolic adversities among others. Patient is agreeable to call the office with any worsening of symptoms or onset of side effects. Patient is agreeable to call 911 or go to the nearest ER should he/she begin having SI/HI.     Discussion:  Patient has had no significant response to conservative management of her shoulder including PT home exercises, medications, and NSAID injections in the shoulder.  We have not done a steroid injection in the shoulder but this would not help  the patient's full-thickness supraspinatus tear.  At this point I believe surgery is her best option and she agrees.  She is scheduled to see Dr. Hull on 11/30/2022 to discuss surgical repair of her shoulder problems.  I have given her topical Pennsaid samples to try to give her some relief.  She is reluctant to do any further medication changes although these were discussed.  If prescription for Pennsaid is desired she will let me know and we will send one in.  Patient can follow-up with me as needed at this point as I am turning over care of this  problem to Dr. Hull.             This document has been electronically signed by Declan Warren DO   November 3, 2022 12:12 EDT    Dictated Utilizing Dragon Dictation: Part of this note may be an electronic transcription/translation of spoken language to printed text using the Dragon Dictation System.

## 2025-03-14 ENCOUNTER — TRANSCRIBE ORDERS (OUTPATIENT)
Dept: ADMINISTRATIVE | Facility: HOSPITAL | Age: 60
End: 2025-03-14
Payer: COMMERCIAL

## 2025-03-14 ENCOUNTER — HOSPITAL ENCOUNTER (OUTPATIENT)
Facility: HOSPITAL | Age: 60
Discharge: HOME | End: 2025-03-14
Admitting: NURSE PRACTITIONER
Payer: COMMERCIAL

## 2025-03-14 DIAGNOSIS — I51.9 MYXEDEMA HEART DISEASE: Primary | ICD-10-CM

## 2025-03-14 DIAGNOSIS — E03.9 MYXEDEMA HEART DISEASE: Primary | ICD-10-CM

## 2025-03-14 DIAGNOSIS — I51.9 MYXEDEMA HEART DISEASE: ICD-10-CM

## 2025-03-14 DIAGNOSIS — E03.9 MYXEDEMA HEART DISEASE: ICD-10-CM

## 2025-03-14 LAB
T4 FREE SERPL-MCNC: 1.13 NG/DL (ref 0.92–1.68)
TSH SERPL DL<=0.05 MIU/L-ACNC: 2.79 UIU/ML (ref 0.27–4.2)

## 2025-03-14 PROCEDURE — 84443 ASSAY THYROID STIM HORMONE: CPT | Performed by: NURSE PRACTITIONER

## 2025-03-14 PROCEDURE — 84439 ASSAY OF FREE THYROXINE: CPT | Performed by: NURSE PRACTITIONER

## 2025-03-14 PROCEDURE — 36415 COLL VENOUS BLD VENIPUNCTURE: CPT | Performed by: NURSE PRACTITIONER
